# Patient Record
Sex: FEMALE | Race: WHITE | NOT HISPANIC OR LATINO | URBAN - METROPOLITAN AREA
[De-identification: names, ages, dates, MRNs, and addresses within clinical notes are randomized per-mention and may not be internally consistent; named-entity substitution may affect disease eponyms.]

---

## 2024-05-30 ENCOUNTER — OFFICE (OUTPATIENT)
Dept: URBAN - METROPOLITAN AREA CLINIC 19 | Facility: CLINIC | Age: 29
End: 2024-05-30

## 2024-05-30 VITALS
HEIGHT: 62 IN | DIASTOLIC BLOOD PRESSURE: 64 MMHG | OXYGEN SATURATION: 98 % | HEART RATE: 76 BPM | WEIGHT: 142 LBS | SYSTOLIC BLOOD PRESSURE: 107 MMHG

## 2024-05-30 DIAGNOSIS — R11.0 NAUSEA: ICD-10-CM

## 2024-05-30 DIAGNOSIS — R19.7 DIARRHEA, UNSPECIFIED: ICD-10-CM

## 2024-05-30 DIAGNOSIS — Z79.1 LONG TERM (CURRENT) USE OF NON-STEROIDAL ANTI-INFLAMMATORIES: ICD-10-CM

## 2024-05-30 DIAGNOSIS — R10.84 GENERALIZED ABDOMINAL PAIN: ICD-10-CM

## 2024-05-30 DIAGNOSIS — K90.49 MALABSORPTION DUE TO INTOLERANCE, NOT ELSEWHERE CLASSIFIED: ICD-10-CM

## 2024-05-30 DIAGNOSIS — D50.9 IRON DEFICIENCY ANEMIA, UNSPECIFIED: ICD-10-CM

## 2024-05-30 PROCEDURE — 99204 OFFICE O/P NEW MOD 45 MIN: CPT

## 2024-05-30 RX ORDER — SODIUM SULFATE, MAGNESIUM SULFATE, AND POTASSIUM CHLORIDE 17.75; 2.7; 2.25 G/1; G/1; G/1
TABLET ORAL
Qty: 24 | Refills: 0 | Status: ACTIVE
Start: 2024-05-30

## 2024-05-30 RX ORDER — OMEPRAZOLE 20 MG/1
20 CAPSULE, DELAYED RELEASE ORAL
Qty: 30 | Refills: 6 | Status: ACTIVE
Start: 2024-05-30

## 2024-05-30 NOTE — SERVICEHPINOTES
28-year-old female is here with complaints of diarrhea.  Reports 1-3 episodes daily for the last year of loose to liquid stool.  Dairy products seem to exacerbate her symptoms.  She has not been on any recent antibiotics.  Drinks a few cokes per week.  Drinks 3 alcoholic beverages per week.  Does not have any diarrhea when she eats sugars.  She has not vape or smoke any marijuana.  Reports she is not pregnant.  Does not take any herbal supplements.  Takes ibuprofen once a week for headaches.  Reports abdominal pain and diarrhea when she eats hamburgers or steaks.  Complaining of generalized abdominal cramping after she eats.  She feels like she gets full after eating a few bites of her meals.  Complaining of nausea without vomiting for the last 6 months.  Nausea is not related to her menstrual cycles.  She has never had an EGD or colonoscopy.  No family history of colon cancer, colon polyps, or IBD.  No cardiac issues, not taking any blood thinners or weight loss medications.  Outside blood work from 05/17/2024 revealed hemoglobin 12.1, hematocrit 36.4, and MCV 94.5.

## 2024-06-14 ENCOUNTER — OFFICE (OUTPATIENT)
Dept: URBAN - METROPOLITAN AREA CLINIC 19 | Facility: CLINIC | Age: 29
End: 2024-06-14

## 2024-06-14 DIAGNOSIS — R10.84 GENERALIZED ABDOMINAL PAIN: ICD-10-CM

## 2024-06-14 DIAGNOSIS — R11.0 NAUSEA: ICD-10-CM

## 2024-06-14 PROCEDURE — 76700 US EXAM ABDOM COMPLETE: CPT | Mod: TC
